# Patient Record
Sex: MALE | Race: BLACK OR AFRICAN AMERICAN | NOT HISPANIC OR LATINO | URBAN - NONMETROPOLITAN AREA
[De-identification: names, ages, dates, MRNs, and addresses within clinical notes are randomized per-mention and may not be internally consistent; named-entity substitution may affect disease eponyms.]

---

## 2017-01-19 NOTE — PATIENT DISCUSSION
Pinguecula Counseling:  I have explained to the patient at length the diagnosis of pinguecula and its pathophysiology. I recommended the patient adequately protect their eyes from excessive UV light and dry, gustavo conditions. The use of artificial tears in dry conditions was encouraged. Return for follow-up as scheduled.

## 2017-01-19 NOTE — PATIENT DISCUSSION
JAYDE OU:  PRESCRIBE ARTIFICIAL TEARS BID - QID. DECREASE OUTDOOR EXPOSURE AND USE UV PROTECTION/ SUNGLASSES WITH OUTDOOR ACTIVITIES. RETURN FOR FOLLOW UP AS SCHEDULED.

## 2018-04-16 NOTE — PATIENT DISCUSSION
VITREOUS SYNERESIS OU: NO HOLES / TEARS SEEN ON DILATED EXAM. RD SIGNS AND SYMPTOMS REVIEWED. REASSURED PATIENT. RECOMMENDED PATIENT CALL IF ANY INCREASE OF FLASHES/FLOATERS.

## 2020-12-14 NOTE — PATIENT DISCUSSION
MILD DRY EYE, WITH CL WEAR OU: PRESCRIBE PF ARTIFICIAL TEARS BID - QID AND OMEGA-3 FISH OIL SUPPLEMENTS MAY HELP. REC REDUCE CL WEAR TIME AND REMOVE QHS OU. RETURN FOR FOLLOW-UP AS SCHEDULED OR SOONER IF SYMPTOMS WORSEN.

## 2021-06-21 ENCOUNTER — IMPORTED ENCOUNTER (OUTPATIENT)
Dept: URBAN - NONMETROPOLITAN AREA CLINIC 1 | Facility: CLINIC | Age: 41
End: 2021-06-21

## 2021-06-21 PROBLEM — H10.423: Noted: 2021-06-21

## 2021-06-21 PROBLEM — H52.223: Noted: 2021-06-21

## 2021-06-21 PROBLEM — H52.03: Noted: 2021-06-21

## 2021-06-21 PROCEDURE — S0620 ROUTINE OPHTHALMOLOGICAL EXA: HCPCS

## 2021-06-21 NOTE — PATIENT DISCUSSION
Hyperopia-Discussed diagnosis with patient sisterUpdated spec Rx given. Recommend lens that will provide comfort as well as protect safety and health of eyes. Chronic conj Discussed w/pt sisterRecommendavid hernandez prn -bid coupon given If no relief instructed sister to contact our office

## 2021-10-28 NOTE — PROCEDURE NOTE: CLINICAL
PROCEDURE NOTE: Punctal Plugs, Stefani Negrete ([id number], [id number]) OU. 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 Prior to treatment, the risks/benefits/alternatives were discussed. The patient wished to proceed with procedure. Temporary collagen plugs were inserted. Patient tolerated procedure well. There were no complications. Post procedure instructions given. Latanya Garvin

## 2022-04-09 ASSESSMENT — VISUAL ACUITY
OS_CC: 20/30 PICTURES
OD_CC: 20/30 PICTURES

## 2022-04-27 NOTE — PROCEDURE NOTE: CLINICAL
PROCEDURE NOTE: Punctal Plugs, Sang Fuentes ([id number], [id number]) OU. 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 Prior to treatment, the risks/benefits/alternatives were discussed. The patient wished to proceed with procedure. Temporary collagen plugs were inserted. Patient tolerated procedure well. There were no complications. Post procedure instructions given. Thom Vallejo

## 2022-09-02 NOTE — PATIENT DISCUSSION
Recommended P&I today. Patient elects procedure. Saline flushed through to throat OU - with ease to OS and resistance met with OD.

## 2022-09-02 NOTE — PROCEDURE NOTE: CLINICAL
PROCEDURE NOTE: Probing of Lacrimal Canaliculi, With or Without Irrigation OU. Prep: Betadine Flush. Risks, benefits and alternatives discussed. The patient desires to proceed with probe and irrigation of the involved puncta today and the puncta/lacrimal system was found to be patent/blocked. See chart plan notes for further discussion. Patient tolerated the procedure well and left in good condition. Sylvia Smith

## 2022-11-02 ENCOUNTER — ESTABLISHED PATIENT (OUTPATIENT)
Dept: RURAL CLINIC 2 | Facility: CLINIC | Age: 42
End: 2022-11-02

## 2022-11-02 DIAGNOSIS — H52.03: ICD-10-CM

## 2022-11-02 DIAGNOSIS — H52.223: ICD-10-CM

## 2022-11-02 PROCEDURE — 92015 DETERMINE REFRACTIVE STATE: CPT

## 2022-11-02 PROCEDURE — 92014 COMPRE OPH EXAM EST PT 1/>: CPT

## 2022-11-02 ASSESSMENT — VISUAL ACUITY
OD_SC: 20/30
OS_SC: 20/30

## 2025-07-21 NOTE — PATIENT DISCUSSION
Reason for call: Medication or medication refill    Have you contacted your pharmacy regarding this refill? yes    If No, direct the patient to contact the Pharmacy and discontinue telephone note using Erroneous Encounter.  If Yes, continue.    Name of medication requested: Clonazepam    How many days of medication do you have left? zero    What pharmacy do you use? [facility]    Preferred method for responding to this message: Telephone Call    Phone number patient can be reached at: Home number on file [phone] (home)    If we cannot reach you directly, may we leave a detailed response at the number you provided? Yes    Patient and wife are wondering why RX Clonazepam was denied by provider    Giovana Maza on 7/21/2025 at 3:21 PM       Vertex Distance: